# Patient Record
Sex: FEMALE | Race: WHITE | NOT HISPANIC OR LATINO | Employment: OTHER | URBAN - METROPOLITAN AREA
[De-identification: names, ages, dates, MRNs, and addresses within clinical notes are randomized per-mention and may not be internally consistent; named-entity substitution may affect disease eponyms.]

---

## 2017-04-11 PROBLEM — R19.7 DIARRHEA: Status: ACTIVE | Noted: 2017-04-11

## 2018-04-27 PROBLEM — Z93.2 ILEOSTOMY PRESENT: Status: ACTIVE | Noted: 2018-04-27

## 2018-04-27 PROBLEM — Z90.49 S/P COLECTOMY: Status: ACTIVE | Noted: 2018-04-27

## 2018-04-27 PROBLEM — E03.9 HYPOTHYROIDISM (ACQUIRED): Status: ACTIVE | Noted: 2018-04-27

## 2018-04-27 PROBLEM — R19.7 DIARRHEA: Status: RESOLVED | Noted: 2017-04-11 | Resolved: 2018-04-27

## 2018-04-27 PROBLEM — F84.2 RETT SYNDROME: Status: ACTIVE | Noted: 2018-04-27

## 2018-04-27 PROBLEM — Z87.19 HISTORY OF ULCERATIVE COLITIS: Status: ACTIVE | Noted: 2018-04-27

## 2018-04-27 PROBLEM — Z15.1 RETT SYNDROME: Status: ACTIVE | Noted: 2018-04-27

## 2018-11-29 PROBLEM — E86.0 DEHYDRATION: Status: ACTIVE | Noted: 2018-11-29

## 2018-11-29 PROBLEM — A41.9 SEPSIS: Status: ACTIVE | Noted: 2018-11-29

## 2018-11-29 PROBLEM — R65.20 SEVERE SEPSIS: Status: ACTIVE | Noted: 2018-11-29

## 2018-11-29 PROBLEM — E43 SEVERE MALNUTRITION: Status: ACTIVE | Noted: 2018-11-29

## 2018-11-29 PROBLEM — E87.0 HYPERNATREMIA: Status: ACTIVE | Noted: 2018-11-29

## 2018-11-29 PROBLEM — G40.909 SEIZURE DISORDER: Status: ACTIVE | Noted: 2018-11-29

## 2018-11-29 PROBLEM — N17.9 AKI (ACUTE KIDNEY INJURY): Status: ACTIVE | Noted: 2018-11-29

## 2018-11-29 PROBLEM — B37.0 ORAL THRUSH: Status: ACTIVE | Noted: 2018-11-29

## 2018-12-03 PROBLEM — R65.11 SIRS OF NON-INFECTIOUS ORIGIN W ACUTE ORGAN DYSFUNCTION: Status: ACTIVE | Noted: 2018-11-29

## 2018-12-05 ENCOUNTER — TELEPHONE (OUTPATIENT)
Dept: NEUROLOGY | Facility: CLINIC | Age: 38
End: 2018-12-05

## 2018-12-05 NOTE — TELEPHONE ENCOUNTER
----- Message from Jenny Baez sent at 12/4/2018 10:50 AM CST -----  Contact: Marimar  Type: Needs Medical Advice    Who Called:  Edie Lambert Call Back Number: [phone] (home)   Additional Information:Nurse Edie from [facility], would to get an appt to f/u the patient is being released today. Call patient to make an appt for a Hosptal f/u. Nurse stated if the appt can be soon please.thanks

## 2018-12-20 PROBLEM — E46 MALNUTRITION: Status: ACTIVE | Noted: 2018-11-29

## 2018-12-20 PROBLEM — R65.11 SIRS OF NON-INFECTIOUS ORIGIN W ACUTE ORGAN DYSFUNCTION: Status: RESOLVED | Noted: 2018-11-29 | Resolved: 2018-12-20

## 2018-12-20 PROBLEM — N17.9 AKI (ACUTE KIDNEY INJURY): Status: RESOLVED | Noted: 2018-11-29 | Resolved: 2018-12-20

## 2018-12-20 PROBLEM — E86.0 DEHYDRATION: Status: RESOLVED | Noted: 2018-11-29 | Resolved: 2018-12-20

## 2018-12-20 PROBLEM — E87.0 HYPERNATREMIA: Status: RESOLVED | Noted: 2018-11-29 | Resolved: 2018-12-20

## 2019-01-04 ENCOUNTER — OFFICE VISIT (OUTPATIENT)
Dept: NEUROLOGY | Facility: CLINIC | Age: 39
End: 2019-01-04
Payer: MEDICARE

## 2019-01-04 VITALS — HEIGHT: 56 IN | RESPIRATION RATE: 20 BRPM | WEIGHT: 62.81 LBS | BODY MASS INDEX: 14.13 KG/M2

## 2019-01-04 DIAGNOSIS — G40.909 SEIZURE DISORDER: Primary | ICD-10-CM

## 2019-01-04 DIAGNOSIS — F84.2 RETT SYNDROME: ICD-10-CM

## 2019-01-04 PROCEDURE — 99215 PR OFFICE/OUTPT VISIT, EST, LEVL V, 40-54 MIN: ICD-10-PCS | Mod: S$PBB,,, | Performed by: PSYCHIATRY & NEUROLOGY

## 2019-01-04 PROCEDURE — 99999 PR PBB SHADOW E&M-EST. PATIENT-LVL III: ICD-10-PCS | Mod: PBBFAC,,, | Performed by: PSYCHIATRY & NEUROLOGY

## 2019-01-04 PROCEDURE — 99213 OFFICE O/P EST LOW 20 MIN: CPT | Mod: PBBFAC,PN | Performed by: PSYCHIATRY & NEUROLOGY

## 2019-01-04 PROCEDURE — 99215 OFFICE O/P EST HI 40 MIN: CPT | Mod: S$PBB,,, | Performed by: PSYCHIATRY & NEUROLOGY

## 2019-01-04 PROCEDURE — 99999 PR PBB SHADOW E&M-EST. PATIENT-LVL III: CPT | Mod: PBBFAC,,, | Performed by: PSYCHIATRY & NEUROLOGY

## 2019-01-04 RX ORDER — OXCARBAZEPINE 300 MG/1
600 TABLET, FILM COATED ORAL 2 TIMES DAILY
COMMUNITY
End: 2019-05-28 | Stop reason: SDUPTHER

## 2019-01-04 NOTE — PROGRESS NOTES
Date: 1/4/2019    Patient ID: No Harmon is a 38 y.o. female.    Referring Provider:  Er, [facility]    Chief Complaint: Seizures      History of Present Illness:  Ms. 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 The patient was accompanied by her mother who also contributed to the following history. She recently saw Dr. Magaña in the hospital and is here to be established to ongoing care for her epilepsy.     She had onset of epilepsy was at age 5. Her seizure semiology she has pupillary diolation, hyperventilations, arms stiffen and shake and legs leg, she will fall. They last 1-2 minutes and she is mildly post-ictal afterwards. She commonly has 3-5 per month. She has never had better control that than. She takes trileptal 600 mg BID and clonazepam 1 mg BID. Her mother notes that she will have a seizure if her medication is late. She was previously on 900 mg BID and she has lost weight.     She was previously controlled but in November had complications due to ulcerative colitis, bowel obstruction, stoma prolapse, and abscess. She was unable to take oral medciations and was switched from her typical regimen of oxcarbazepine 600 mg BID and clonazepam 1 mg BID to keppra. She was on IV depakote briefly and got hyperammonia. She was then switched to keppra. She was having seizures and had side effects. She became very somnolent and dehydrated on the keppra and had reduced PO intake. She was seen by Dr. Magaña in the hospital who stopped the keppra and restarted oxcarbazepine and clonazepam.     Since the hospitalization, she has continued to have her baseline seizure frequency.     No family history of seizures. No meningitis/encephalitis. No head trauma. No stroke. Uncomplicated full term delivery.     Past medications include tegretol (had to wean off before the study), Trofinetide (study drug she took for a month  with complete seizure control), keppra (somnolence), and depakote (hyperammonemia). No VNS.     MRI - one at [facility] when she was young and diagnosed by Dr. Goodman. CT head in our system from 2016 was personally reviewed. This showed significant sinus disease but normal brain.     EEG - none available. About 5 years ago during study at [facility] This was during a study though and we don't know if they are available to be sent.     Outside records from [facility] reviewed. Trileptal level in outside records from [facility] was 27.7 in 2016. 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    Review of Systems:   UTO - nonverbal    Allergies:  Review of patient's allergies indicates:  No Known Allergies    Current Medications:  Current Outpatient Medications   Medication Sig Dispense Refill    clonazePAM (KLONOPIN) 0.5 MG tablet Take 2 tablets (1 mg total) by mouth 2 (two) times daily. for 7 days 28 tablet 0    levothyroxine (SYNTHROID) 25 MCG tablet Take 25 mcg by mouth once daily.      OXcarbazepine (TRILEPTAL) 300 MG Tab Take 600 mg by mouth 2 (two) times daily.       No current facility-administered medications for this visit.        Past Medical History:  Past Medical History:   Diagnosis Date    Allergy     Encounter for blood transfusion     History of Nissen fundoplication     Rett's syndrome     Rett's syndrome     Seizures     Ulcerative colitis 04/2017       Past Surgical History:  Past Surgical History:   Procedure Laterality Date    ABDOMINAL SURGERY  10/2017    removal of entire colon and has ileostomy    COLON SURGERY      COLONOSCOPY N/A 4/11/2017    [facility]    NISSEN FUNDOPLICATION      prolapse stoma      TOTAL COLECTOMY         Family History:  family history includes Allergies in her mother; Arthritis in her  "brother; Multiple sclerosis in her sister.    Social History:   reports that  has never smoked. she has never used smokeless tobacco. She reports that she does not drink alcohol or use drugs.    Physical Exam:  Vitals:    01/04/19 1315   Resp: 20   Weight: 28.5 kg (62 lb 12.8 oz)   Height: 4' 8" (1.422 m)   PainSc: 0-No pain     Body mass index is 14.08 kg/m². - Limited due to rett syndrome/nonverbal  General: thin, small frame, sleepy  Eyes: unable to visualize optic discs  Musculoskeletal: moves all extremities well.  Peripheral vascular: No edema noted    Neurological Exam:  Mental status: Sleepy. Unable to give any history.   Speech/Language: nonverbal  Motor: moves all extremities, resists arm movement  Reflexes: normal    Data:  I have personally reviewed the referring provider's notes, labs, & imaging made available to me today.       Labs:  CBC:   Lab Results   Component Value Date    WBC 9.09 12/04/2018    HGB 8.8 (L) 12/04/2018    HCT 26.5 (L) 12/04/2018     12/04/2018    MCV 95 12/04/2018    RDW 15.0 (H) 12/04/2018     BMP:   Lab Results   Component Value Date     12/04/2018    K 3.8 12/04/2018     12/04/2018    CO2 27 12/04/2018    BUN 24 (H) 12/04/2018    CREATININE 0.67 12/04/2018    GLU 94 12/04/2018    CALCIUM 8.9 12/04/2018    MG 2.1 12/02/2018    PHOS 3.7 12/02/2018     LFTS;   Lab Results   Component Value Date    PROT 7.6 12/02/2018    ALBUMIN 3.6 12/02/2018    BILITOT 0.1 (L) 12/02/2018    AST 28 12/02/2018    ALKPHOS 131 12/02/2018    ALT 32 12/02/2018     COAGS:   Lab Results   Component Value Date    INR 1.2 12/01/2018     FLP:   Lab Results   Component Value Date    CHOL 139 11/30/2018    HDL 23 (L) 11/30/2018    LDLCALC 73.0 11/30/2018    TRIG 215 (H) 11/30/2018    CHOLHDL 16.5 (L) 11/30/2018         Imaging:  I have personally reviewed the imaging, CT head from 2016 appears normal.     Assessment and Plan:  Ms. 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 " The seizure semiology sounds hypermotor - frontal? She is uncontrolled and has never had better control than 3-5 per month. No recently drug level. I will obtain oxcarbazepine level and consider adjustments. For now, we will continue doses. I will try to obtain records from [place] and [facility] for EEGs but will repeat here for our system. No need to repeat MRI at this time unless unable to obtain from outside hospitals or EEGs change/we want to consider surgical options.     She has previously tried tegretol, depakote, and keppra. I would try to change out clonazepam for Onfi, trial vimpat, or add zonisamide or topiramate. Caution will need to be given for dosing as she is only 62 pounds. I will see them back in 3 months or sooner if needed.     Seizure disorder  -     Oxcarbazepine level; Future; Expected date: 01/04/2019  -     Cancel: Comprehensive metabolic panel; Future; Expected date: 01/04/2019  -     Cancel: CBC auto differential; Future; Expected date: 01/04/2019  -     EEG,w/awake & asleep record; Future  -     CBC auto differential; Future; Expected date: 01/04/2019  -     Comprehensive metabolic panel; Future; Expected date: 01/04/2019    Rett syndrome  -     Oxcarbazepine level; Future; Expected date: 01/04/2019  -     Cancel: Comprehensive metabolic panel; Future; Expected date: 01/04/2019  -     Cancel: CBC auto differential; Future; Expected date: 01/04/2019  -     EEG,w/awake & asleep record; Future  -     CBC auto differential; Future; Expected date: 01/04/2019  -     Comprehensive metabolic panel; Future; Expected date: 01/04/2019    Greater than 50% of the patient's 40 minute clinic visit was spent on face to face counseling and arranging care.

## 2019-01-04 NOTE — LETTER
January 4, 2019      [facility]  [address] [facility] [address]           [place] [facility]  [address] [facility] [address] [place] [address]  Phone: [phone]  Fax: [phone]          Patient: No Harmon   MR Number: [id number]   YOB: 1980   Date of Visit: 1/4/2019       Dear [facility]:    Thank you for referring No Harmon to me for evaluation. Attached you will find relevant portions of my assessment and plan of care.    If you have questions, please do not hesitate to call me. I look forward to following No Harmon along with you.    Sincerely,    Yani Mccray MD    Enclosure  CC:  No Recipients    If you would like to receive this communication electronically, please contact [email or web address] or [phone] to request more information on [employer] Link access.    For providers and/or their staff who would like to refer a patient to [facility], please contact us through our one-stop-shop provider referral line, [facility], at [phone].    If you feel you have received this communication in error or would no longer like to receive these types of communications, please e-mail [email or web address]

## 2019-01-07 ENCOUNTER — TELEPHONE (OUTPATIENT)
Dept: NEUROLOGY | Facility: CLINIC | Age: 39
End: 2019-01-07

## 2019-01-07 DIAGNOSIS — G40.909 SEIZURE DISORDER: Primary | ICD-10-CM

## 2019-01-07 NOTE — TELEPHONE ENCOUNTER
Spoke with patient's mom. She called because she was wondering if Dr. Mccray wanted her to get a Klonopin level as well for her bloodwork or not. Please advise.

## 2019-01-07 NOTE — TELEPHONE ENCOUNTER
----- Message from Silverio Espinoza sent at 1/7/2019  4:34 PM CST -----  Type:  Patient Call Back    Who Called: pt mom rommel  Does the patient know what this is regarding?: mom needs to talk about blood order make up. She feels that there needs to be more tests ran on the order.  Best Call Back Number:  [phone]  Additional Information:  Please call pt and leave a detailed message if there is no answer.

## 2019-01-14 ENCOUNTER — TELEPHONE (OUTPATIENT)
Dept: NEUROLOGY | Facility: CLINIC | Age: 39
End: 2019-01-14

## 2019-01-14 NOTE — TELEPHONE ENCOUNTER
----- Message from Jackie Munson sent at 1/14/2019  1:16 PM CST -----  Contact: Irma Saez want to inform office fax number to fax patient orders please fax to [phone], any questions please call back at [phone] (home)

## 2019-01-23 ENCOUNTER — TELEPHONE (OUTPATIENT)
Dept: NEUROLOGY | Facility: CLINIC | Age: 39
End: 2019-01-23

## 2019-01-23 NOTE — TELEPHONE ENCOUNTER
External labs received and reviewed. CMP normal except for elevated Alk phos at 128. This could either be liver or bone. If she has not had a DEXA bone scan, we need to consider this. Platelet count is high at 481. This should not be due to her medications. I recommend she followup with her primary care provider regarding this.     Oxcarb metabolite level is 13.9 (reference 8-35) and therefore I would not make any changes. Clonzepam level is lower at 14 which is good but it still could be making her sleepy. We can consider switching that out for a different medication or adding a new medication but I'd like to see what her EEG looks like first.

## 2019-02-06 DIAGNOSIS — R56.9 SEIZURE: Primary | ICD-10-CM

## 2019-02-06 DIAGNOSIS — G40.909 SEIZURE DISORDER: ICD-10-CM

## 2019-02-06 RX ORDER — CLONAZEPAM 0.5 MG/1
TABLET ORAL
Qty: 150 TABLET | Refills: 3 | Status: SHIPPED | OUTPATIENT
Start: 2019-02-06 | End: 2019-07-07 | Stop reason: SDUPTHER

## 2019-02-06 NOTE — TELEPHONE ENCOUNTER
Dr. Mccray, per your last clinic note, you stated in your plan that you may try to switch pts clonazepam to onfi or other medications; pt mom is calling for refill on clonazepam; please advise.

## 2019-02-06 NOTE — TELEPHONE ENCOUNTER
----- Message from Maryanne Salgado sent at 2/6/2019 11:45 AM CST -----  Type:  RX Refill Request    Who Called:  Mom Irma  Refill or New Rx:  New   RX Name and Strength:  Clonazepam .5 mg  How is the patient currently taking it? (ex. 1XDay):  2.5 by mouth twice daily  Is this a 30 day or 90 day RX:  30  Preferred Pharmacy with phone number:    [facility]/pharmacy #[id number] - [place] - [address]  [place] [address]  Phone: [phone] Fax: [phone]  Local or Mail Order:  local  Ordering Provider:  Previously Dr RADHA Garcia  Best Call Back Number:  [phone]  Additional Information:  She has 5 days supply left at this point.  Thank you!

## 2019-03-22 ENCOUNTER — OFFICE VISIT (OUTPATIENT)
Dept: OBSTETRICS AND GYNECOLOGY | Facility: CLINIC | Age: 39
End: 2019-03-22
Payer: MEDICARE

## 2019-03-22 VITALS — BODY MASS INDEX: 15.03 KG/M2 | WEIGHT: 67 LBS

## 2019-03-22 DIAGNOSIS — N91.2 AMENORRHEA: ICD-10-CM

## 2019-03-22 DIAGNOSIS — R63.4 ABNORMAL WEIGHT LOSS: ICD-10-CM

## 2019-03-22 DIAGNOSIS — E28.2 PCOS (POLYCYSTIC OVARIAN SYNDROME): Primary | ICD-10-CM

## 2019-03-22 PROCEDURE — 99204 PR OFFICE/OUTPT VISIT, NEW, LEVL IV, 45-59 MIN: ICD-10-PCS | Mod: S$PBB,,, | Performed by: SPECIALIST

## 2019-03-22 PROCEDURE — 99999 PR PBB SHADOW E&M-EST. PATIENT-LVL II: ICD-10-PCS | Mod: PBBFAC,,, | Performed by: SPECIALIST

## 2019-03-22 PROCEDURE — 99212 OFFICE O/P EST SF 10 MIN: CPT | Mod: PBBFAC,PN | Performed by: SPECIALIST

## 2019-03-22 PROCEDURE — 99999 PR PBB SHADOW E&M-EST. PATIENT-LVL II: CPT | Mod: PBBFAC,,, | Performed by: SPECIALIST

## 2019-03-22 PROCEDURE — 99204 OFFICE O/P NEW MOD 45 MIN: CPT | Mod: S$PBB,,, | Performed by: SPECIALIST

## 2019-03-22 NOTE — LETTER
March 28, 2019      Bernadette Moore MD  [address] [place] [address] Dr Amadeo TAYLOR  [facility] [address]           [facility]  [address] [address] [address]  Phone: [phone]  Fax: [phone]          Patient: No Harmon   MR Number: [id number]   YOB: 1980   Date of Visit: 3/22/2019       Dear Dr. Bernadette Moore:    Thank you for referring No Harmon to me for evaluation. Attached you will find relevant portions of my assessment and plan of care.    If you have questions, please do not hesitate to call me. I look forward to following No Harmon along with you.    Sincerely,    Gary Bradshaw MD    Enclosure  CC:  No Recipients    If you would like to receive this communication electronically, please contact [email or web address] or [phone] to request more information on [employer] Link access.    For providers and/or their staff who would like to refer a patient to [facility], please contact us through our one-stop-shop provider referral line, [facility], at [phone].    If you feel you have received this communication in error or would no longer like to receive these types of communications, please e-mail [email or web address]

## 2019-03-28 ENCOUNTER — TELEPHONE (OUTPATIENT)
Dept: OBSTETRICS AND GYNECOLOGY | Facility: CLINIC | Age: 39
End: 2019-03-28

## 2019-03-28 NOTE — TELEPHONE ENCOUNTER
Patient's mother notified of blood work. The mother wants to know why a pap smear hasn't been done. She states that if it's just to keep from causing the patient trauma then we should not worry about that since the patient doesn't feel pain.

## 2019-03-28 NOTE — TELEPHONE ENCOUNTER
----- Message from Gary Bradshaw MD sent at 3/28/2019  2:58 PM CDT -----  Pt has elevated prolactin level which will result in amenorrhea.  I do not recommend correcting it however. I rec repeating it in 3 -6 months

## 2019-03-28 NOTE — PROGRESS NOTES
39 yo mentally challenged WF Go presents with caregiver for eval, secondary amenorrhea, alopecia, several years. Pt very thin with approx 3-5% body fat and in addition, less than 48 kilos. Caregiver denies any overt weight changes , nipple discharge, change in activity or meds.  Past Medical History:   Diagnosis Date    Allergy     Encounter for blood transfusion     History of Nissen fundoplication     Rett's syndrome     Rett's syndrome     Seizures     Ulcerative colitis 04/2017       Past Surgical History:   Procedure Laterality Date    ABDOMINAL SURGERY  10/2017    removal of entire colon and has ileostomy    COLON SURGERY      COLONOSCOPY N/A 4/11/2017    [facility]formed by [facility] ENDO    NISSEN FUNDOPLICATION      prolapse stoma      TOTAL COLECTOMY         Family History   Problem Relation Age of Onset    Allergies Mother     Multiple sclerosis Sister     Arthritis Brother         psoriatric arthritis       Social History     Socioeconomic History    Marital status: Single     Spouse name: None    Number of children: None    Years of education: None    Highest education level: None   Occupational History    None   Social Needs    Financial resource strain: None    Food insecurity:     Worry: None     Inability: None    Transportation needs:     Medical: None     Non-medical: None   Tobacco Use    Smoking status: Never Smoker    Smokeless tobacco: Never Used   Substance and Sexual Activity    Alcohol use: No    Drug use: No    Sexual activity: Never   Lifestyle    Physical activity:     Days per week: None     Minutes per session: None    Stress: None   Relationships    Social connections:     Talks on phone: None     Gets together: None     Attends Sabianist service: None     Active member of club or organization: None     Attends meetings of clubs or organizations: None     Relationship status: None    Intimate partner violence:     Fear of current or ex  partner: None     Emotionally abused: None     Physically abused: None     Forced sexual activity: None   Other Topics Concern    None   Social History Narrative    None       Current Outpatient Medications   Medication Sig Dispense Refill    clonazePAM (KLONOPIN) 0.5 MG tablet Take 2 1/2 tablets twice per day 150 tablet 3    levothyroxine (SYNTHROID) 25 MCG tablet Take 25 mcg by mouth once daily.      OXcarbazepine (TRILEPTAL) 300 MG Tab Take 600 mg by mouth 2 (two) times daily.       No current facility-administered medications for this visit.        Review of patient's allergies indicates:  No Known Allergies    Review of System:   General: no chills, fever, night sweats, weight gain or weight loss  Psychological: no depression or suicidal ideation  Breasts: no new or changing breast lumps, nipple discharge or masses.  Respiratory: no cough, shortness of breath, or wheezing  Cardiovascular: no chest pain or dyspnea on exertion  Gastrointestinal: no abdominal pain, change in bowel habits, or black or bloody stools  Genito-Urinary: no incontinence, urinary frequency/urgency or vulvar/vaginal symptoms, pelvic pain. AMENORRHEA  Musculoskeletal: no gait disturbance or muscular weakness    Discussed critcal fat percentage as well as likely protein deficient and thus would explain hair thinning and loss as well as secondary amenorrhea.  I rec Prolactin and PCO panel to assess further.  No overt intervention needed at this point. Would rec for diagnostic purposes only.  Will follow

## 2019-05-28 ENCOUNTER — TELEPHONE (OUTPATIENT)
Dept: NEUROLOGY | Facility: CLINIC | Age: 39
End: 2019-05-28

## 2019-05-28 RX ORDER — OXCARBAZEPINE 300 MG/1
600 TABLET, FILM COATED ORAL 2 TIMES DAILY
Qty: 120 TABLET | Refills: 11 | Status: SHIPPED | OUTPATIENT
Start: 2019-05-28 | End: 2020-03-20

## 2019-05-28 NOTE — TELEPHONE ENCOUNTER
----- Message from Saad Cedillo sent at 5/28/2019  3:53 PM CDT -----  Type:  RX Refill Request    Who Called:  Patient  Refill or New Rx:  Refill  RX Name and Strength:  Trileptel 600mg  Ordering Provider: Dr. Jones, see Yani Mccray now  Pharmacy:   [facility]/pharmacy #[id number] - [place] - [address]  [place] [address]  Phone: [phone] Fax: [phone]  Best Call Back Number:  [phone] (home)   Additional Information:  Would like to know if the pills can be prescribed in 300mgs as that is easier for the patient to take 2 of rather than 1 600mg - please call to advise.

## 2019-07-07 DIAGNOSIS — G40.909 SEIZURE DISORDER: ICD-10-CM

## 2019-07-08 RX ORDER — CLONAZEPAM 0.5 MG/1
TABLET ORAL
Qty: 150 TABLET | Refills: 3 | Status: SHIPPED | OUTPATIENT
Start: 2019-07-08

## 2019-11-05 ENCOUNTER — TELEPHONE (OUTPATIENT)
Dept: NEUROLOGY | Facility: CLINIC | Age: 39
End: 2019-11-05

## 2019-11-05 NOTE — TELEPHONE ENCOUNTER
----- Message from Reinaldo Nair sent at 11/5/2019  9:42 AM CST -----  Type:  Sooner Apoointment Request    Caller is requesting a sooner appointment.  Caller declined first available appointment listed below.  Caller will not accept being placed on the waitlist and is requesting a message be sent to doctor.    Name of Caller:  Mother-Irma Harmon   When is the first available appointment?    Symptoms:    Best Call Back Number: [phone]  Additional Information:  Pt mother requesting to schedule EEG awake and asleep record.

## 2019-12-16 ENCOUNTER — TELEPHONE (OUTPATIENT)
Dept: NEUROLOGY | Facility: CLINIC | Age: 39
End: 2019-12-16

## 2019-12-16 NOTE — TELEPHONE ENCOUNTER
----- Message from Mallorie Tamayo sent at 12/16/2019 11:34 AM CST -----  Contact: GURDEEP - Irma- mother  Pt mom  would like to be called back regarding seizure rx   Pt can be reached at [phone]

## 2019-12-17 ENCOUNTER — TELEPHONE (OUTPATIENT)
Dept: NEUROLOGY | Facility: CLINIC | Age: 39
End: 2019-12-17

## 2019-12-17 NOTE — TELEPHONE ENCOUNTER
----- Message from Tila Leary sent at 12/16/2019  3:13 PM CST -----  Contact: Irma Harmon (Mother)  Type:  Patient Returning Call    Who Called:  Irma Harmon (Mother)  Who Left Message for Patient:  Linda  Does the patient know what this is regarding?:  yes  Best Call Back Number:    Additional Information:  Call to pod, no answer.

## 2019-12-18 ENCOUNTER — TELEPHONE (OUTPATIENT)
Dept: NEUROLOGY | Facility: CLINIC | Age: 39
End: 2019-12-18

## 2019-12-18 NOTE — TELEPHONE ENCOUNTER
Spoke with patient's mother. She stated that patient has had some breakthrough seizures but it's due to mother giving her medications sometimes a little late time wise. She stated that the patient has been having other health issues so they haven't had the EEG yet, but she's going to schedule it. She wanted to know if patient should hold meds the morning of EEG or take them.

## 2019-12-18 NOTE — TELEPHONE ENCOUNTER
----- Message from Shahzad Pettit sent at 12/18/2019 10:30 AM CST -----  Type: Needs Medical Advice    Who Called:  Irma Harmon (Mother    Best Call Back Number: [phone]  Additional Information: Caller states that she would like a callback regarding the patient should have her seizure medicine prior to her EEG.

## 2020-03-20 RX ORDER — OXCARBAZEPINE 300 MG/1
600 TABLET, FILM COATED ORAL 2 TIMES DAILY
Qty: 360 TABLET | Refills: 3 | Status: SHIPPED | OUTPATIENT
Start: 2020-03-20 | End: 2021-01-19

## 2020-04-06 ENCOUNTER — TELEPHONE (OUTPATIENT)
Dept: NEUROLOGY | Facility: CLINIC | Age: 40
End: 2020-04-06

## 2020-04-06 NOTE — TELEPHONE ENCOUNTER
----- Message from Radha Dalal MA sent at 4/6/2020  9:59 AM CDT -----  Contact: patient mother Hilda   Patient mom states patient need medication changed or letter written to say patient need medication (patient received letter from [employer] )      Please call to advise patient mom Irma at [phone]

## 2020-04-06 NOTE — TELEPHONE ENCOUNTER
----- Message from Lisset Partida sent at 4/6/2020  2:09 PM CDT -----  Contact: pt [phone]  Call placed to pod.  Patient is returning a call back to the office she just missed a call from the nurse.  Call back [phone]

## 2020-04-06 NOTE — TELEPHONE ENCOUNTER
Returned call to pt mom who received a letter from insurance company that they are no longer covering trileptal. Mom states that pt was on a previous regimen of tegretol and klonipin and only DC'd the tegretol bc they were in a study at [facility] years ago. Mom states that the tegretol/klonipin worked. Can they switch back and if so, can you send it over please?

## 2020-04-07 ENCOUNTER — TELEPHONE (OUTPATIENT)
Dept: NEUROLOGY | Facility: CLINIC | Age: 40
End: 2020-04-07

## 2020-04-07 NOTE — TELEPHONE ENCOUNTER
----- Message from Laura Marmolejo sent at 4/7/2020  9:01 AM CDT -----  Contact: Irma Harmon (Mother) [phone]   Irma Harmon (Mother) is requesting a call back from the nurse stated the insurance is no longer covering TRILEPTAL.  Can patient get an alternative or letter stating TRILEPTAL is medically necessary.    Please call the patient mother upon request at phone number [phone].

## 2020-04-08 ENCOUNTER — TELEPHONE (OUTPATIENT)
Dept: NEUROLOGY | Facility: CLINIC | Age: 40
End: 2020-04-08

## 2020-04-08 NOTE — TELEPHONE ENCOUNTER
Mom will fax the letter from the insurance company to the office and we will see if we can get a PA.

## 2020-04-08 NOTE — TELEPHONE ENCOUNTER
Returned call to pt mom and discussed medication and Dr. Carson's request for medication denial letter. Mom states that she addressed this today with the insurance company and hopefully the issue has been resolved. States that she will call us if she needs anything. Fax number provided for letter to show Dr. Carson.

## 2020-04-08 NOTE — TELEPHONE ENCOUNTER
----- Message from Erica Garza sent at 4/7/2020  4:42 PM CDT -----    Type:  RX Refill Request  Please  Call  My for  All details Who Called:  Pt mom rommel  Refill   RX Name and Strength:  Trileptal 2  300 Mg twice   No  Longer  Covered by ins / we  Something to replace //   Preferred Pharmacy with phone number:    [facility]/pharmacy #[id number] - [place] - [address]  [place] [address]  Phone: [phone] Fax: [phone]  Best Call Back Number:  [phone]  Additional Information:  Please call for details    Ins  No longer covering  med

## 2020-04-14 PROBLEM — K56.609 SBO (SMALL BOWEL OBSTRUCTION): Status: ACTIVE | Noted: 2020-04-14

## 2020-04-14 PROBLEM — K94.19 ILEOSTOMY PROLAPSE: Status: ACTIVE | Noted: 2020-04-14

## 2021-01-19 RX ORDER — OXCARBAZEPINE 300 MG/1
600 TABLET, FILM COATED ORAL 2 TIMES DAILY
Qty: 360 TABLET | Refills: 0 | Status: SHIPPED | OUTPATIENT
Start: 2021-01-19 | End: 2022-11-18 | Stop reason: SDUPTHER

## 2022-10-13 ENCOUNTER — OFFICE VISIT (OUTPATIENT)
Dept: NEUROLOGY | Facility: CLINIC | Age: 42
End: 2022-10-13
Payer: MEDICARE

## 2022-10-13 VITALS — BODY MASS INDEX: 16.56 KG/M2 | HEIGHT: 55 IN | WEIGHT: 71.56 LBS

## 2022-10-13 DIAGNOSIS — F84.2 RETT SYNDROME: ICD-10-CM

## 2022-10-13 DIAGNOSIS — G40.909 SEIZURE DISORDER: Primary | ICD-10-CM

## 2022-10-13 PROCEDURE — 99204 PR OFFICE/OUTPT VISIT, NEW, LEVL IV, 45-59 MIN: ICD-10-PCS | Mod: S$PBB,,, | Performed by: PSYCHIATRY & NEUROLOGY

## 2022-10-13 PROCEDURE — 99204 OFFICE O/P NEW MOD 45 MIN: CPT | Mod: S$PBB,,, | Performed by: PSYCHIATRY & NEUROLOGY

## 2022-10-13 PROCEDURE — 99999 PR PBB SHADOW E&M-EST. PATIENT-LVL II: CPT | Mod: PBBFAC,,, | Performed by: PSYCHIATRY & NEUROLOGY

## 2022-10-13 PROCEDURE — 99999 PR PBB SHADOW E&M-EST. PATIENT-LVL II: ICD-10-PCS | Mod: PBBFAC,,, | Performed by: PSYCHIATRY & NEUROLOGY

## 2022-10-13 PROCEDURE — 99212 OFFICE O/P EST SF 10 MIN: CPT | Mod: PBBFAC,PO | Performed by: PSYCHIATRY & NEUROLOGY

## 2022-10-13 NOTE — PROGRESS NOTES
Date: 10/13/2022    Patient ID: No Harmon is a 41 y.o. female.    Chief Complaint: Seizures      History of Present Illness:  Ms. Harmon is a 41 y.o. female who presents for followup of Rett syndrome and epilepsy. The patient was accompanied by her mother and father who gave the following history. I last saw her in Jan 2019.     Interval history: Over the past 3 years, she has continued to have 2-3 seizures per week. They have noticed that she is very time sensitive to medication. He hears her had seizures at night.     She is on trileptal 600 mg BID and klonopin 1.25 mg BID. Sometimes she will have an extra but it doesn't seemed to make her more sedated.     She will be starting on teriflunide when it becomes available, likely March 2023.     Seizure history:   She had onset of epilepsy was at age 5. Her seizure semiology she has pupillary diolation, hyperventilations, arms stiffen and shake and legs leg, she will fall. They last 1-2 minutes and she is mildly post-ictal afterwards. She commonly has 3-5 per month. She has never had better control that than. She takes trileptal 600 mg BID and clonazepam 1 mg BID. Her mother notes that she will have a seizure if her medication is late. She was previously on 900 mg BID and she has lost weight.      She was previously controlled but in November 2018 had complications due to ulcerative colitis, bowel obstruction, stoma prolapse, and abscess. She was unable to take oral medciations and was switched from her typical regimen of oxcarbazepine 600 mg BID and clonazepam 1 mg BID to keppra. She was on IV depakote briefly and got hyperammonia. She was then switched to keppra. She was having seizures and had side effects. She became very somnolent and dehydrated on the keppra and had reduced PO intake. She was seen by Dr. Magaña in the hospital who stopped the keppra and restarted oxcarbazepine and clonazepam.      No family history of seizures. No  meningitis/encephalitis. No head trauma. No stroke. Uncomplicated full term delivery.      Past medications include tegretol (had to wean off before the study), Trofinetide (study drug she took for a month with complete seizure control), keppra (somnolence), and depakote (hyperammonemia). No VNS.      MRI - one at [facility] when she was young and diagnosed by Dr. Goodman. CT head in our system from 2016 was personally reviewed. This showed significant sinus disease but normal brain.      EEG - none available. About 5 years ago during study at [facility] This was during a study though and we don't know if they are available to be sent.      Outside records from [facility] reviewed. Trileptal level in outside records from [facility] was 27.7 in 2016. 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       Allergies:  Review of patient's allergies indicates:  No Known Allergies    Current Medications:  Current Outpatient Medications   Medication Sig Dispense Refill    clonazePAM (KLONOPIN) 0.5 MG tablet TAKE 2 1/2 TABLETS BY MOUTH TWICE PER DAY (Patient taking differently: Take 1 mg by mouth 2 (two) times daily.) 150 tablet 3    levothyroxine (SYNTHROID) 25 MCG tablet Take 25 mcg by mouth before breakfast.       OXcarbazepine (TRILEPTAL) 300 MG Tab TAKE 2 TABLETS (600 MG TOTAL) BY MOUTH 2 (TWO) TIMES DAILY. 360 tablet 0     No current facility-administered medications for this visit.       Past Medical History:  Past Medical History:   Diagnosis Date    Allergy     Encounter for blood transfusion     History of Nissen fundoplication     Rett's syndrome     Rett's syndrome     Seizures     Ulcerative colitis 04/2017       Past Surgical History:  Past Surgical History:   Procedure Laterality Date    ABDOMINAL SURGERY  10/2017    removal of entire colon and has ileostomy    COLON SURGERY       "COLONOSCOPY N/A 4/11/2017    Procedure: COLONOSCOPY;  Surgeon: Isra Gonzalez MD;  Location: [facility] ENDO;  Service: Endoscopy;  Laterality: N/A;    ILEOSTOMY REVISION N/A 4/14/2020    Procedure: REVISION, ILEOSTOMY;  Surgeon: Larry Campos MD;  Location: [facility] OR;  Service: General;  Laterality: N/A;    NISSEN FUNDOPLICATION      prolapse stoma      REPAIR OF EPIGASTRIC HERNIA N/A 4/14/2020    Procedure: REPAIR, HERNIA, EPIGASTRIC-repair parastoma hernia;  Surgeon: Larry Campos MD;  Location: [facility] OR;  Service: General;  Laterality: N/A;    TOTAL COLECTOMY         Family History:  family history includes Allergies in her mother; Arthritis in her brother; Multiple sclerosis in her sister.    Social History:   reports that she has never smoked. She has never used smokeless tobacco. She reports that she does not drink alcohol and does not use drugs.    Physical Exam:  Vitals:    10/13/22 0904   Weight: 32.5 kg (71 lb 8.6 oz)   Height: 4' 5" (1.346 m)     Body mass index is 17.91 kg/m².    Neurological Exam:  Mental status: awake and alert  CN: Pupils round and reactive to light  Speech/Language: nonverbal  Motor: resists arm movement  Reflexes: normal bilateral patellar    Data:  I have personally reviewed other provider's notes, labs, & imaging made available to me today.     Labs:  CBC:   Lab Results   Component Value Date    WBC 8.5 03/17/2022    HGB 13.1 03/17/2022    HCT 40.4 03/17/2022     (H) 03/17/2022    MCV 93.3 03/17/2022    RDW 14.3 03/17/2022     BMP:   Lab Results   Component Value Date     03/17/2022    K 4.2 03/17/2022     03/17/2022    CO2 23 03/17/2022    BUN 12 03/17/2022    CREATININE 0.74 03/17/2022     (H) 03/17/2022    CALCIUM 9.1 03/17/2022    MG 2.1 04/17/2020    PHOS 3.7 12/02/2018     LFTS;   Lab Results   Component Value Date    PROT 7.5 03/17/2022    ALBUMIN 4.2 03/17/2022    BILITOT 0.3 03/17/2022    AST 23 03/17/2022    ALKPHOS 100 04/17/2020    ALT 22 " 03/17/2022     COAGS:   Lab Results   Component Value Date    INR 1.2 12/01/2018     FLP:   Lab Results   Component Value Date    CHOL 139 11/30/2018    HDL 23 (L) 11/30/2018    LDLCALC 73.0 11/30/2018    TRIG 215 (H) 11/30/2018    CHOLHDL 16.5 (L) 11/30/2018       Imaging:  I have personally reviewed the imaging, CT head showed no acute abnormality in 2016.     Assessment and Plan:  Ms. Harmon is a 41 y.o. female here for followup of epilepsy and jane syndrome. Will check trough level of oxcarbazepine to see if need to increase. Will also check CBC and CMP as well for monitoring purposes. Could also consider adding midday klonopin dose but would like to avoid this due to potential sedative effect. They note she will be starting on trofinetide next year and when she was in the trial for this her seizures stopped. They are hopeful for improvement once starting that medication. We discussed that potentially we could even reduce seizure medication doses if that is the case.      Seizure disorder  -     Cancel: Comprehensive metabolic panel; Future; Expected date: 10/13/2022  -     Cancel: Oxcarbazepine level; Future; Expected date: 10/13/2022  -     Cancel: CBC Auto Differential; Future; Expected date: 10/13/2022  -     CBC Auto Differential; Future; Expected date: 10/13/2022  -     Comprehensive metabolic panel; Future; Expected date: 10/13/2022  -     Oxcarbazepine level; Future; Expected date: 10/13/2022    Rett syndrome  -     Cancel: Comprehensive metabolic panel; Future; Expected date: 10/13/2022  -     Cancel: Oxcarbazepine level; Future; Expected date: 10/13/2022  -     Cancel: CBC Auto Differential; Future; Expected date: 10/13/2022  -     CBC Auto Differential; Future; Expected date: 10/13/2022  -     Comprehensive metabolic panel; Future; Expected date: 10/13/2022  -     Oxcarbazepine level; Future; Expected date: 10/13/2022

## 2022-11-02 ENCOUNTER — TELEPHONE (OUTPATIENT)
Dept: NEUROLOGY | Facility: CLINIC | Age: 42
End: 2022-11-02
Payer: MEDICARE

## 2022-11-02 NOTE — TELEPHONE ENCOUNTER
Lab results received from [employer]. Copy sent to scanning and copy given to Dr. Mccray to review.

## 2022-11-18 ENCOUNTER — PATIENT MESSAGE (OUTPATIENT)
Dept: NEUROLOGY | Facility: CLINIC | Age: 42
End: 2022-11-18
Payer: MEDICARE

## 2022-11-18 RX ORDER — OXCARBAZEPINE 300 MG/1
900 TABLET, FILM COATED ORAL 2 TIMES DAILY
Qty: 540 TABLET | Refills: 3 | Status: SHIPPED | OUTPATIENT
Start: 2022-11-18 | End: 2022-11-28 | Stop reason: SDUPTHER

## 2022-11-18 NOTE — TELEPHONE ENCOUNTER
Oxcarb level 23.8 (8-35)  WBC high at 12  AST and ALT normal. Na 138    Can increase oxcarbazepine to 900 mg twice daily.     Would recommend following up with PCP about the high white blood cell count to have it repeated and make sure it normalizes

## 2022-11-28 ENCOUNTER — PATIENT MESSAGE (OUTPATIENT)
Dept: NEUROLOGY | Facility: CLINIC | Age: 42
End: 2022-11-28
Payer: MEDICARE

## 2022-11-28 RX ORDER — OXCARBAZEPINE 300 MG/1
750 TABLET, FILM COATED ORAL 2 TIMES DAILY
Qty: 540 TABLET | Refills: 3 | Status: SHIPPED | OUTPATIENT
Start: 2022-11-28 | End: 2024-01-11

## 2023-10-28 PROBLEM — K94.19: Status: ACTIVE | Noted: 2023-10-28

## 2024-01-11 DIAGNOSIS — G40.909 SEIZURE DISORDER: Primary | ICD-10-CM

## 2024-01-11 RX ORDER — OXCARBAZEPINE 300 MG/1
TABLET, FILM COATED ORAL
Qty: 540 TABLET | Refills: 0 | Status: SHIPPED | OUTPATIENT
Start: 2024-01-11

## 2024-01-31 ENCOUNTER — TELEPHONE (OUTPATIENT)
Dept: NEUROLOGY | Facility: CLINIC | Age: 44
End: 2024-01-31
Payer: MEDICARE

## 2024-01-31 NOTE — TELEPHONE ENCOUNTER
Spoke to the pt's mom, appt scheduled on 4/16/24 at 1300 with Dr. Mccray.  Date, time and location discussed.

## 2024-03-21 PROBLEM — Z71.89 OSTOMY NURSE CONSULTATION: Status: ACTIVE | Noted: 2024-03-21

## 2024-09-12 ENCOUNTER — TELEPHONE (OUTPATIENT)
Dept: PAIN MEDICINE | Facility: CLINIC | Age: 44
End: 2024-09-12
Payer: MEDICARE

## 2024-09-12 NOTE — TELEPHONE ENCOUNTER
----- Message from Brynn Beasley sent at 9/11/2024  9:21 AM CDT -----  Regarding: Needs to reschedule  Type: Needs Medical Advice  Who Called:  Se    Best Call Back Number: [phone]    Additional Information: The 0ct 2nd appt doesn't work, they will be out of town they need to reschedule please treasure

## 2024-09-19 ENCOUNTER — OFFICE VISIT (OUTPATIENT)
Dept: NEUROLOGY | Facility: CLINIC | Age: 44
End: 2024-09-19
Payer: MEDICARE

## 2024-09-19 VITALS — HEIGHT: 56 IN | BODY MASS INDEX: 15.69 KG/M2

## 2024-09-19 DIAGNOSIS — F84.2 RETT SYNDROME: ICD-10-CM

## 2024-09-19 DIAGNOSIS — G40.909 SEIZURE DISORDER: Primary | ICD-10-CM

## 2024-09-19 PROCEDURE — 99213 OFFICE O/P EST LOW 20 MIN: CPT | Mod: PBBFAC,PO | Performed by: PSYCHIATRY & NEUROLOGY

## 2024-09-19 PROCEDURE — 99999 PR PBB SHADOW E&M-EST. PATIENT-LVL III: CPT | Mod: PBBFAC,,, | Performed by: PSYCHIATRY & NEUROLOGY

## 2024-09-19 PROCEDURE — 99215 OFFICE O/P EST HI 40 MIN: CPT | Mod: S$PBB,,, | Performed by: PSYCHIATRY & NEUROLOGY

## 2024-09-19 PROCEDURE — G2211 COMPLEX E/M VISIT ADD ON: HCPCS | Mod: S$PBB,,, | Performed by: PSYCHIATRY & NEUROLOGY

## 2024-09-19 RX ORDER — CLOBAZAM 10 MG/1
10 TABLET ORAL NIGHTLY
Qty: 30 EACH | Refills: 5 | Status: SHIPPED | OUTPATIENT
Start: 2024-09-19 | End: 2025-03-18

## 2024-09-19 NOTE — PROGRESS NOTES
Date: 9/19/2024    Patient ID: No Harmon is a 43 y.o. female.    Chief Complaint: Seizures      History of Present Illness:  Ms. Harmon is a 43 y.o. female who presents for followup of Rett syndrome and epilepsy. The patient was accompanied by her mother and father who gave the following history. I last saw her in 2022.      Interval history: Over the past 2 years since our last appointment, she has continued to have seizures about 2-3 seizures per week. Last though was 2 months ago when medications weren't given. She is currently on trileptal 600 mg BID and klonopin 1.25 mg BID.     The parents are concerned though because she has decreased function, more stiffness, and more unsteady with more falls. The mom notes increased caregiving as she is no longer walking.      She also follows with [facility]. Neurology there had her on buspirone and tried to wean klonopin. They put her back up on klonopin because seizure frequency was increasing. Buspirone caused grogginess so they weaned off it.     She has been part of a trial and then on trofenetide bu stopped this in Oct 2023. They didn't see benefit and her gait worsened. They are now thinking though that she is even worse off it so thinking of restarting it.      Seizure history:   She had onset of epilepsy was at age 5. Her seizure semiology she has pupillary diolation, hyperventilations, arms stiffen and shake and legs leg, she will fall. They last 1-2 minutes and she is mildly post-ictal afterwards. She commonly has 3-5 per month. She has never had better control that than. She takes trileptal 600 mg BID and clonazepam 1 mg BID. Her mother notes that she will have a seizure if her medication is late. She was previously on 900 mg BID and she has lost weight.      She was previously controlled but in November 2018 had complications due to ulcerative colitis, bowel obstruction, stoma prolapse, and abscess. She was unable to take oral  medciations and was switched from her typical regimen of oxcarbazepine 600 mg BID and clonazepam 1 mg BID to keppra. She was on IV depakote briefly and got hyperammonia. She was then switched to keppra. She was having seizures and had side effects. She became very somnolent and dehydrated on the keppra and had reduced PO intake. She was seen by Dr. Magaña in the hospital who stopped the keppra and restarted oxcarbazepine and clonazepam.      No family history of seizures. No meningitis/encephalitis. No head trauma. No stroke. Uncomplicated full term delivery.      Past medications include tegretol (had to wean off before the study), Trofinetide (study drug she took for a month with complete seizure control), keppra (somnolence), and depakote (hyperammonemia). No VNS.      MRI - one at [facility] when she was young and diagnosed by Dr. Goodman. CT head in our system from 2016 was personally reviewed. This showed significant sinus disease but normal brain.      EEG - none available. About 5 years ago during study at [facility]. This was during a study though and we don't know if they are available to be sent.      Outside records from [facility] reviewed. Trileptal level in outside records from [facility] was 27.7 in 2016. MECP2 mutational analysis is recently been done and demonstrated a disease causing mutation [id number] in exon 4 (frameshift mutation).       Allergies:  Review of patient's allergies indicates:  No Known Allergies    Current Medications:  Current Outpatient Medications   Medication Sig Dispense Refill    clonazePAM (KLONOPIN) 0.5 MG tablet TAKE 2 1/2 TABLETS BY MOUTH TWICE PER DAY (Patient taking differently: Take 1.25 mg by mouth 2 (two) times daily.) 150 tablet 3    levothyroxine (SYNTHROID) 25 MCG tablet Take 25 mcg by mouth before breakfast.       OXcarbazepine (TRILEPTAL) 300 MG Tab TAKE 3 TABLETS (900 MG TOTAL) BY MOUTH 2 (TWO) TIMES DAILY. 540 tablet  "0    cloBAZam (ONFI) 10 mg Tab Take 1 each (10 mg total) by mouth every evening. 30 each 5     No current facility-administered medications for this visit.       Past Medical History:  Past Medical History:   Diagnosis Date    Allergy     Encounter for blood transfusion     History of Nissen fundoplication     Rett's syndrome     Rett's syndrome     Seizures     Ulcerative colitis 04/2017       Past Surgical History:  Past Surgical History:   Procedure Laterality Date    ABDOMINAL SURGERY  10/2017    removal of entire colon and has ileostomy    COLON SURGERY      COLONOSCOPY N/A 4/11/2017    Procedure: COLONOSCOPY;  Surgeon: Isra Gonzalez MD;  Location: [facility] ENDO;  Service: Endoscopy;  Laterality: N/A;    ENTEROSTOMY N/A 10/28/2023    Procedure: Tube enterostomy;  Surgeon: OMID Burns MD;  Location: [facility] OR;  Service: General;  Laterality: N/A;    EXAMINATION UNDER ANESTHESIA N/A 10/28/2023    Procedure: Exam under anesthesia;  Surgeon: OMID Burns MD;  Location: [facility] OR;  Service: General;  Laterality: N/A;    ILEOSTOMY REVISION N/A 4/14/2020    Procedure: REVISION, ILEOSTOMY;  Surgeon: Larry Campos MD;  Location: [facility] OR;  Service: General;  Laterality: N/A;    NISSEN FUNDOPLICATION      prolapse stoma      REPAIR OF EPIGASTRIC HERNIA N/A 4/14/2020    Procedure: REPAIR, HERNIA, EPIGASTRIC-repair parastoma hernia;  Surgeon: Larry Campos MD;  Location: [facility] OR;  Service: General;  Laterality: N/A;    TOTAL COLECTOMY         Family History:  family history includes Allergies in her mother; Arthritis in her brother; Multiple sclerosis in her sister.    Social History:   reports that she has never smoked. She has never used smokeless tobacco. She reports that she does not drink alcohol and does not use drugs.    Physical Exam:  Vitals:    09/19/24 1530   Height: 4' 8" (1.422 m)   PainSc: 0-No pain     Body mass index is 15.69 kg/m².    Neurological Exam:  Mental status: Awake and alert  Speech " language: moaning, non verbal  Cranial nerves: PERRL  Motor: holding all limbs stiff and tense, eventually was able to get legs to nearly 180 degrees. Couldn't get BP or check tone in arms     Data:  I have personally reviewed other provider's notes, labs, & imaging made available to me today.     Labs:  CBC:   Lab Results   Component Value Date    WBC 13.13 (H) 10/29/2023    HGB 11.9 (L) 10/29/2023    HCT 36.4 (L) 10/29/2023     10/29/2023    MCV 96 10/29/2023    RDW 14.3 10/29/2023     BMP:   Lab Results   Component Value Date     10/29/2023    K 3.5 10/29/2023     10/29/2023    CO2 29 10/29/2023    BUN 8 10/29/2023    CREATININE 0.68 10/29/2023     (H) 10/29/2023    CALCIUM 7.8 (L) 10/29/2023    MG 2.3 10/29/2023    PHOS 3.0 10/29/2023     LFTS;   Lab Results   Component Value Date    PROT 6.3 10/29/2023    ALBUMIN 3.4 (L) 10/29/2023    BILITOT 0.3 10/29/2023    AST 26 10/29/2023    ALKPHOS 93 10/29/2023    ALT 19 10/29/2023     COAGS:   Lab Results   Component Value Date    INR 1.1 10/28/2023     FLP:   Lab Results   Component Value Date    CHOL 139 11/30/2018    HDL 23 (L) 11/30/2018    LDLCALC 73.0 11/30/2018    TRIG 215 (H) 11/30/2018    CHOLHDL 16.5 (L) 11/30/2018       Assessment and Plan:  Ms. Harmon is a 43 y.o. female here for followup of seizures and Rett syndrome.     Her seizures are overall well controlled. They think the klonopin is contributing to her being sleepy. Will try to replace it with Onfi to see if longer acting benzo will allow a wean of this. Will check trileptal level to ensure not toxic and not contributing to imbalance. Continue current dose of trileptal for now.     They are considering restarting trofinetide to see if it would help slow regression or help with the stiffening.  I think this is worth trying.     Seizure disorder  -     cloBAZam (ONFI) 10 mg Tab; Take 1 each (10 mg total) by mouth every evening.  Dispense: 30 each; Refill: 5  -      Oxcarbazepine level; Future; Expected date: 09/19/2024    Rett syndrome  -     cloBAZam (ONFI) 10 mg Tab; Take 1 each (10 mg total) by mouth every evening.  Dispense: 30 each; Refill: 5  -     Oxcarbazepine level; Future; Expected date: 09/19/2024         I spent a total of 49 minutes on the day of the visit.This includes face to face time and non-face to face time preparing to see the patient (eg, review of tests), Obtaining and/or reviewing separately obtained history, Documenting clinical information in the electronic or other health record, Independently interpreting results and communicating results to the patient/family/caregiver, or Care coordination.     Visit today is associated with current or anticipated ongoing medical care related to this patient's single serious condition/complex condition (epilepsy). Plan to followup in 6 months for ongoing management.

## 2024-09-19 NOTE — PATIENT INSTRUCTIONS
Try to change klonopin to Onfi    Week 1: Start Onfi 5 mg nightly, lower klonopin to 1 mg twice a day  Week 2: Increase Onfi to 10 mg nightly, lower klonopin to 0.5 mg twice a day  Week 3: Continue Onfi 10 mg nightly, lower klonopin to 0.25 mg twice a day  Week 4:  Continue Onfi 10 mg nightly, lower klonopin to 0.25 mg morning only for a week, then stop klonopin     Let me know if you notice seizures and we might need to increase Onfi

## 2024-09-25 ENCOUNTER — TELEPHONE (OUTPATIENT)
Dept: NEUROLOGY | Facility: CLINIC | Age: 44
End: 2024-09-25
Payer: MEDICARE

## 2024-09-25 NOTE — TELEPHONE ENCOUNTER
----- Message from Meagan Mccray sent at 9/25/2024  3:33 PM CDT -----  Regarding: med question  Contact: mom at [phone]  Type:  Pharmacy Calling to Clarify an RX    Name of Caller:  mom / rommel    Pharmacy Name:    [id number] [place] - [address]  [address]  [place] [address]  Phone: [phone] Fax: [phone]    Prescription Name:  cloBAZam (ONFI) 10 mg Tab and clonazePAM (KLONOPIN) 0.5 MG tablet    What do they need to clarify?:  [facility] not filling ONFI. Please call to clarify how provider wants meds handled.    Best Call Back Number:  [phone]    Additional Information:

## 2024-09-25 NOTE — TELEPHONE ENCOUNTER
Spoke to pharmacy and pharmacist states patient needs PA done, insurance isnt covering medication. LVM for pt's mom to call back regarding pharmacy not filling Onfi. We didn't receive a PA request.

## 2024-09-26 ENCOUNTER — PATIENT MESSAGE (OUTPATIENT)
Dept: NEUROLOGY | Facility: CLINIC | Age: 44
End: 2024-09-26
Payer: MEDICARE

## 2024-09-26 ENCOUNTER — TELEPHONE (OUTPATIENT)
Dept: NEUROLOGY | Facility: CLINIC | Age: 44
End: 2024-09-26
Payer: MEDICARE

## 2024-09-26 NOTE — TELEPHONE ENCOUNTER
----- Message from Gertrudis Jimenez sent at 9/26/2024 10:29 AM CDT -----  Type:  Needs Medical Advice    Who Called: Irma - mother    Symptoms (please be specific): na     How long has patient had these symptoms:  na    Pharmacy name and phone #:    [id number] [place] - [address]  [address]  [place] [address]  Phone: [phone] Fax: [phone]    Would the patient rather a call back or a response via [facility]? Call back    Best Call Back Number: [phone]      Additional Information: Irma is asking tp speak with someone in regards to her daughters medication      Please call Back to advise. Thanks!

## 2024-09-26 NOTE — TELEPHONE ENCOUNTER
Left voicemail to call back and sent [employer] message regarding whether parent would prefer to have rx filled with [facility] or send [facility] to [facility]. Advised to please let us know which they prefer.

## 2024-09-26 NOTE — TELEPHONE ENCOUNTER
PA for Clobazam submitted to both Medicare and Medicaid. Awaiting response from insurance. Patients mother notifed by portal.

## 2024-10-30 ENCOUNTER — OFFICE VISIT (OUTPATIENT)
Dept: PHYSICAL MEDICINE AND REHAB | Facility: CLINIC | Age: 44
End: 2024-10-30
Payer: MEDICARE

## 2024-10-30 VITALS — SYSTOLIC BLOOD PRESSURE: 108 MMHG | DIASTOLIC BLOOD PRESSURE: 60 MMHG | HEART RATE: 73 BPM

## 2024-10-30 DIAGNOSIS — G82.50 SPASTIC QUADRIPARESIS: Primary | ICD-10-CM

## 2024-10-30 DIAGNOSIS — Z78.9 IMPAIRED MOBILITY AND ACTIVITIES OF DAILY LIVING: ICD-10-CM

## 2024-10-30 DIAGNOSIS — Z15.1 RETT SYNDROME: ICD-10-CM

## 2024-10-30 DIAGNOSIS — Z74.09 IMPAIRED MOBILITY AND ACTIVITIES OF DAILY LIVING: ICD-10-CM

## 2024-10-30 DIAGNOSIS — F84.2 RETT SYNDROME: ICD-10-CM

## 2024-10-30 PROCEDURE — 99205 OFFICE O/P NEW HI 60 MIN: CPT | Mod: S$PBB,,, | Performed by: PHYSICAL MEDICINE & REHABILITATION

## 2024-10-30 PROCEDURE — 99213 OFFICE O/P EST LOW 20 MIN: CPT | Mod: PBBFAC,PO | Performed by: PHYSICAL MEDICINE & REHABILITATION

## 2024-10-30 PROCEDURE — 99999 PR PBB SHADOW E&M-EST. PATIENT-LVL III: CPT | Mod: PBBFAC,,, | Performed by: PHYSICAL MEDICINE & REHABILITATION

## 2024-11-08 ENCOUNTER — TELEPHONE (OUTPATIENT)
Dept: PHYSICAL MEDICINE AND REHAB | Facility: CLINIC | Age: 44
End: 2024-11-08
Payer: MEDICARE

## 2024-11-08 NOTE — TELEPHONE ENCOUNTER
LVM to call back or send a message through [employer]. No further needs at this time.   Lindsey Abreu RN       ----- Message from [employer] sent at 11/8/2024 10:05 AM CST -----  Type:  Sooner Apoointment Request    Caller is requesting a sooner appointment.  Caller declined first available appointment listed below.  Caller will not accept being placed on the waitlist and is requesting a message be sent to doctor.  Name of Caller: Se Solis  When is the first available appointment? N/a  Symptoms: botox  Would the patient rather a call back or a response via [facility]? call back/  Best Call Back Number:[phone]    Additional Information: Thanks

## 2024-11-25 ENCOUNTER — CLINICAL SUPPORT (OUTPATIENT)
Dept: PHYSICAL MEDICINE AND REHAB | Facility: CLINIC | Age: 44
End: 2024-11-25
Payer: MEDICARE

## 2024-11-25 DIAGNOSIS — G82.50 SPASTIC QUADRIPARESIS: Primary | ICD-10-CM

## 2024-11-25 PROCEDURE — 99999 PR PBB SHADOW E&M-EST. PATIENT-LVL II: CPT | Mod: PBBFAC,,, | Performed by: PHYSICAL MEDICINE & REHABILITATION

## 2024-11-25 PROCEDURE — 64643 CHEMODENERV 1 EXTREM 1-4 EA: CPT | Mod: S$PBB,,, | Performed by: PHYSICAL MEDICINE & REHABILITATION

## 2024-11-25 PROCEDURE — 99999PBSHW PR PBB SHADOW TECHNICAL ONLY FILED TO HB: Mod: JZ,PBBFAC,,

## 2024-11-25 PROCEDURE — 99499 UNLISTED E&M SERVICE: CPT | Mod: S$PBB,,, | Performed by: PHYSICAL MEDICINE & REHABILITATION

## 2024-11-25 PROCEDURE — 64643 CHEMODENERV 1 EXTREM 1-4 EA: CPT | Mod: PBBFAC,PO | Performed by: PHYSICAL MEDICINE & REHABILITATION

## 2024-11-25 PROCEDURE — 64642 CHEMODENERV 1 EXTREMITY 1-4: CPT | Mod: PBBFAC,PO | Performed by: PHYSICAL MEDICINE & REHABILITATION

## 2024-11-25 PROCEDURE — 95874 GUIDE NERV DESTR NEEDLE EMG: CPT | Mod: 26,S$PBB,, | Performed by: PHYSICAL MEDICINE & REHABILITATION

## 2024-11-25 RX ADMIN — ONABOTULINUMTOXINA 300 UNITS: 100 INJECTION, POWDER, LYOPHILIZED, FOR SOLUTION INTRADERMAL; INTRAMUSCULAR at 09:11

## 2024-11-25 NOTE — PROGRESS NOTES
[facility] PROCEDURE NOTE    CHIEF COMPLAINT:   Chief Complaint   Patient presents with    Injections     300 Botox        HISTORY OF PRESENT ILLNESS: No Harmon is a 43 y.o. female who presents to me for planned procedure/injection of focal neurotoxin for management of the below noted diagnosis.     Medications:   Current Outpatient Medications on File Prior to Visit   Medication Sig Dispense Refill    cloBAZam (ONFI) 10 mg Tab Take 1 each (10 mg total) by mouth every evening. 30 each 5    clonazePAM (KLONOPIN) 0.5 MG tablet TAKE 2 1/2 TABLETS BY MOUTH TWICE PER  tablet 3    levothyroxine (SYNTHROID) 25 MCG tablet Take 25 mcg by mouth before breakfast.       OXcarbazepine (TRILEPTAL) 300 MG Tab TAKE 3 TABLETS (900 MG TOTAL) BY MOUTH 2 (TWO) TIMES DAILY. 540 tablet 0     No current facility-administered medications on file prior to visit.       Allergies: Review of patient's allergies indicates:  No Known Allergies    Vitals: There were no vitals filed for this visit.    ASSESSMENT:   1. Spastic quadriparesis        PLAN:   1. Procedure/injection was completed for management of above diagnosis.    2. Please see procedure note from today's visit with further details.     Left medial gastrocnemius                  100 units  Left flexor digitorum longus                50 units  Right medial gastrocnemius               100 units  Right flexor digitorum longus              50 units    RTC 1 month to assess response.

## 2024-11-25 NOTE — PROCEDURES
Botulinum Injection  Location: [facility]    Date/Time: 11/25/2024 9:00 AM    Performed by: Magaly Jung DO  Authorized by: Magaly Jung DO      Consent:      Consent obtained:  Written     Consent given by:  Patient     Risks discussed:  Bleeding, infection, dysphagia, pain and weakness     Alternatives discussed:  No treatment    Universal protocol:      Site/side verified:  Yes       Immediately prior to procedure a time out was called:  Yes       Patient identity confirmed:  Verbally with patient    Procedure details:      EMG used?:  Yes     Diluted by:  Preservative free saline     Toxin (Brand):  OnaBoNT-A (Botox)     Total number of units available:  300     Muscle area injected: leg    Lower extremity - leg:      Right flexor digitorum longus:  50 units divided amongst site(s)     Left flexor digitorum longus:  50 units divided amongst site(s)     Right medial head gastrocnemius:  100 units divided amongst site(s)     Left medial head gastrocnemius:  100 units divided amongst site(s)       Total units injected:  300     Total units wasted:  0    Medications: 300 Units onabotulinumtoxina 100 unit    Post-procedure details:      Patient tolerance of procedure:  Tolerated well, no immediate complications

## 2025-01-03 ENCOUNTER — OFFICE VISIT (OUTPATIENT)
Dept: PHYSICAL MEDICINE AND REHAB | Facility: CLINIC | Age: 45
End: 2025-01-03
Payer: MEDICARE

## 2025-01-03 VITALS — BODY MASS INDEX: 16.66 KG/M2 | WEIGHT: 74.31 LBS

## 2025-01-03 DIAGNOSIS — G82.50 SPASTIC QUADRIPARESIS: Primary | ICD-10-CM

## 2025-01-03 DIAGNOSIS — F84.2 RETT SYNDROME: ICD-10-CM

## 2025-01-03 DIAGNOSIS — Z78.9 IMPAIRED MOBILITY AND ACTIVITIES OF DAILY LIVING: ICD-10-CM

## 2025-01-03 DIAGNOSIS — Z15.1 RETT SYNDROME: ICD-10-CM

## 2025-01-03 DIAGNOSIS — Z74.09 IMPAIRED MOBILITY AND ACTIVITIES OF DAILY LIVING: ICD-10-CM

## 2025-01-03 PROCEDURE — 99212 OFFICE O/P EST SF 10 MIN: CPT | Mod: PBBFAC,PO | Performed by: PHYSICAL MEDICINE & REHABILITATION

## 2025-01-03 PROCEDURE — 99999 PR PBB SHADOW E&M-EST. PATIENT-LVL II: CPT | Mod: PBBFAC,,, | Performed by: PHYSICAL MEDICINE & REHABILITATION

## 2025-01-03 NOTE — PROGRESS NOTES
[facility] VISIT     Primary Care Provider: Bernadette Moore MD     CHIEF COMPLAINT:   1.   Chief Complaint   Patient presents with    Follow-up     Botox to BLE. Mom doesn't think the Botox helped, but said pt was walking better.      2. Mobility and function management recommendations.     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     They are here today with family, who provide history due to patient cognitive and speech dysfunction.     Previously seen for Botox injections to the following:  Left medial gastrocnemius                  100 units  Left flexor digitorum longus                50 units  Right medial gastrocnemius               100 units  Right flexor digitorum longus              50 units    Reports,  F/u Botox injections: no noticeable difference after the injections, however they do note that she is walking more but they believe this is from improved titration of seizure meds with neurology.   F/u custom MWC: working with [facility] on delivery.     Mobility/function: see above  Dysphagia: no concerns  Pain: no concerns  Skin: denies  Bowel: ostomy   - Beth manages  Bladder: diaper  - OB/GYN [place]    Medications: clonazepam, clobazam (working with neurology on titration)  Injections: Botox BLE   Surgical procedures: none    EQUIPMENT:  Braces: none  Wheelchair: MWC self funded  Walker: none    MOBILITY/TRANSFERS:  Walking: non-ambulatory     ACTIVITIES OF DAILY LIVING:  Upper extremity dressing: dep  Lower extremity dressing: dep  Bathe: dep  Groom: dep  Toilet: dep    THERAPY/LOCATION:  PT: none currently  OT: none currently    LIVING SITUATION: lives parents, raised home [address]    PAST MEDICAL HISTORY:  Past Medical History:   Diagnosis Date    Allergy     Encounter for  blood transfusion     History of Nissen fundoplication     Rett's syndrome     Rett's syndrome     Seizures     Ulcerative colitis 04/2017        PAST SURGICAL HISTORY:   Past Surgical History:   Procedure Laterality Date    ABDOMINAL SURGERY  10/2017    removal of entire colon and has ileostomy    COLON SURGERY      COLONOSCOPY N/A 4/11/2017    Procedure: COLONOSCOPY;  Surgeon: Isra Gonzalez MD;  Location: [facility] ENDO;  Service: Endoscopy;  Laterality: N/A;    ENTEROSTOMY N/A 10/28/2023    Procedure: Tube enterostomy;  Surgeon: OMID Burns MD;  Location: [facility] OR;  Service: General;  Laterality: N/A;    EXAMINATION UNDER ANESTHESIA N/A 10/28/2023    Procedure: Exam under anesthesia;  Surgeon: OMID Burns MD;  Location: [facility] OR;  Service: General;  Laterality: N/A;    ILEOSTOMY REVISION N/A 4/14/2020    Procedure: REVISION, ILEOSTOMY;  Surgeon: Larry Campos MD;  Location: [facility] OR;  Service: General;  Laterality: N/A;    NISSEN FUNDOPLICATION      prolapse stoma      REPAIR OF EPIGASTRIC HERNIA N/A 4/14/2020    Procedure: REPAIR, HERNIA, EPIGASTRIC-repair parastoma hernia;  Surgeon: Larry Campos MD;  Location: [facility] OR;  Service: General;  Laterality: N/A;    TOTAL COLECTOMY          FAMILY HISTORY:   Family History   Problem Relation Name Age of Onset    Allergies Mother      Multiple sclerosis Sister      Arthritis Brother          psoriatric arthritis       SOCIAL HISTORY:    Social History     Socioeconomic History    Marital status: Single   Tobacco Use    Smoking status: Never    Smokeless tobacco: Never   Substance and Sexual Activity    Alcohol use: No    Drug use: No    Sexual activity: Never     Social Drivers of Health     Financial Resource Strain: Patient Declined (9/18/2024)    Overall Financial Resource Strain (CARDIA)     Difficulty of Paying Living Expenses: Patient declined   Food Insecurity: No Food Insecurity (9/18/2024)    Hunger Vital Sign     Worried About Running Out of Food  in the Last Year: Never true     Ran Out of Food in the Last Year: Never true   Transportation Needs: No Transportation Needs (10/28/2023)    PRAPARE - Transportation     Lack of Transportation (Medical): No     Lack of Transportation (Non-Medical): No   Physical Activity: Unknown (9/18/2024)    Exercise Vital Sign     Days of Exercise per Week: 0 days   Stress: No Stress Concern Present (9/18/2024)    [employer] - Occupational Stress Questionnaire     Feeling of Stress : Not at all   Housing Stability: Low Risk  (10/28/2023)    Housing Stability Vital Sign     Unable to Pay for Housing in the Last Year: No     Number of Places Lived in the Last Year: 1     Unstable Housing in the Last Year: No       MEDICATIONS:     Current Outpatient Medications:     cloBAZam (ONFI) 10 mg Tab, Take 1 each (10 mg total) by mouth every evening., Disp: 30 each, Rfl: 5    clonazePAM (KLONOPIN) 0.5 MG tablet, TAKE 2 1/2 TABLETS BY MOUTH TWICE PER DAY, Disp: 150 tablet, Rfl: 3    levothyroxine (SYNTHROID) 25 MCG tablet, Take 25 mcg by mouth before breakfast. , Disp: , Rfl:     OXcarbazepine (TRILEPTAL) 300 MG Tab, TAKE 3 TABLETS (900 MG TOTAL) BY MOUTH 2 (TWO) TIMES DAILY., Disp: 540 tablet, Rfl: 0     ALLERGIES:   Review of patient's allergies indicates:  No Known Allergies    REVIEW OF SYSTEMS: unable to assess due to patient congitive and speech delays    PHYSICAL EXAMINATION:   VITALS:   There were no vitals filed for this visit.      GENERAL: The patient is awake, alert, rubbing her face with right hand throughout exam  HEENT: Normocephalic, atraumatic.   HEART: Appears well perfused. No lower extremity edema.   LUNGS: No increased work of breath.   ABDOMEN: Benign.   EXTREMITIES: Warm, capillary refill less than 2 seconds. No cyanosis or edema.   MUSCULOSKELETAL: No leg length discrepancy. No gross deformity.     NEUROMUSCULAR:   Before toxin injections:   Modified Royer Scale:  1:  1+: bilateral  "elbow flexors  2: bilateral ankle plantarflexors, bilateral ankle toe flexors  3:  4:     After toxin injections:     Modified Royer Scale:  1:  1+: bilateral elbow flexors; bilateral ankle plantarflexors, bilateral ankle toe flexors  2:   3:  4:   Inappropriate sitting balance.   Muscle stretch reflexes are 3+ throughout both upper and lower extremities.   + clonus was elicited at both ankles.    EQUIPMENT: [employer] with significant spacing between bilateral hips and not lateral supports to hold proper posture.    ASSESSMENT:   1. Spastic quadriparesis        2. Rett syndrome        3. 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 The following recommendations and plan were discussed in depth with the patient who voiced understanding and was in agreement.     PLAN:   Spasticity:   - we discussed options for management at this time are with oral medication, focal neurotoxin, focal cryoneurolysis with Iovera, and focal neurolysis with phenol as follows:   - will avoid initiation of orals at this time as Neurology team is actively working on titration of medications; consider use of dantrolene in future as it does not have cognitive disturbance risks  - family unsure of change after Botox injections, examination with slight improvement in tone. For now we will see how she progresses as she has shown recent improvements in function after titration of seizure medication.   - family will contact our office with concerns going forward and we could consider repeat injections after the "washout" period to determine how much injections were helping her function    Bracing:    - no needs at this time    Equipment:   - needs improved sitting position in MWC,  custom MWC ordered. Working with [facility].     Bowel:  - continue with ostomy cares    Bladder:   - incontinence " with diapers    Therapy:   - PT:   - OT:   - ST:     I would like to have her return to clinic as needed.     30 minutes of total time spent on the encounter, which includes face to face time and non-face to face time preparing to see the patient (eg, review of tests), obtaining and/or reviewing separately obtained history, documenting clinical information in the electronic or other health record, independently interpreting results (not separately reported), communicating results to the patient/family/caregiver, and/or care coordination (not separately reported).    Ostomy bag

## 2025-03-12 ENCOUNTER — OFFICE VISIT (OUTPATIENT)
Dept: NEUROLOGY | Facility: CLINIC | Age: 45
End: 2025-03-12
Payer: MEDICARE

## 2025-03-12 VITALS — HEIGHT: 56 IN | BODY MASS INDEX: 16.66 KG/M2

## 2025-03-12 DIAGNOSIS — S09.90XA HEAD TRAUMA, INITIAL ENCOUNTER: Primary | ICD-10-CM

## 2025-03-12 DIAGNOSIS — G40.909 SEIZURE DISORDER: ICD-10-CM

## 2025-03-12 PROCEDURE — 99215 OFFICE O/P EST HI 40 MIN: CPT | Mod: S$PBB,,, | Performed by: PSYCHIATRY & NEUROLOGY

## 2025-03-12 PROCEDURE — G2211 COMPLEX E/M VISIT ADD ON: HCPCS | Mod: S$PBB,,, | Performed by: PSYCHIATRY & NEUROLOGY

## 2025-03-12 PROCEDURE — 99999 PR PBB SHADOW E&M-EST. PATIENT-LVL III: CPT | Mod: PBBFAC,,, | Performed by: PSYCHIATRY & NEUROLOGY

## 2025-03-12 PROCEDURE — 99213 OFFICE O/P EST LOW 20 MIN: CPT | Mod: PBBFAC,PO | Performed by: PSYCHIATRY & NEUROLOGY

## 2025-03-12 RX ORDER — CLONAZEPAM 0.5 MG/1
0.5 TABLET ORAL 2 TIMES DAILY PRN
COMMUNITY

## 2025-03-12 RX ORDER — LEVOCETIRIZINE DIHYDROCHLORIDE 5 MG/1
2.5 TABLET, FILM COATED ORAL
COMMUNITY

## 2025-03-12 RX ORDER — CLOBAZAM 2.5 MG/ML
2.5 SUSPENSION ORAL NIGHTLY
Qty: 30 ML | Refills: 5 | Status: SHIPPED | OUTPATIENT
Start: 2025-03-12

## 2025-03-12 NOTE — PROGRESS NOTES
Date: 3/12/2025    Patient ID: No Harmon is a 44 y.o. female.    Chief Complaint: Seizures and Follow-up      History of Present Illness:  Ms. Harmon is a 44 y.o. female who presents for followup of Rett syndrome and epilepsy. The patient was accompanied by her mother and father who gave the following history.     Interval history: At her last appointment in September 2024, we started Onfi nightly and stopped klonopin. She is currently taking Onfi 5 mg nightly. 10 mg made her too sedated. They took her off klonopin completely but seizures worsened. They added back klonopin 0.5 mg BID for seizures. She is having seizures weekly, small ones. She is also on trileptal 900 mg BID. Level was 29 in Sep 2024. Between 9/9/24 and today she has had 16 seizures.      She is not walking much. She has been leaning more to the right frequently when sitting of walking. She will fall if not assisted. She has to had 1 person assist to walk. She is toe walking. She can take steps. She has hit her head with the falls.      She was part of a trial and on trofenetide but stopped this in Oct 2023. In October 2024, they restarted this at 40 mL BID. They have not noticed improvement in her walking.     She has seen Dr. Jung in PMR and recommended botox which they did not notice any significant difference.      Seizure history:   She had onset of epilepsy was at age 5. Her seizure semiology she has pupillary diolation, hyperventilations, arms stiffen and shake and legs leg, she will fall. They last 1-2 minutes and she is mildly post-ictal afterwards. She commonly has 3-5 per month. She has never had better control that than. She takes trileptal 600 mg BID and clonazepam 1 mg BID. Her mother notes that she will have a seizure if her medication is late. She was previously on 900 mg BID and she has lost weight.      She was previously controlled but in November 2018 had complications due to ulcerative colitis, bowel obstruction, stoma  prolapse, and abscess. She was unable to take oral medciations and was switched from her typical regimen of oxcarbazepine 600 mg BID and clonazepam 1 mg BID to keppra. She was on IV depakote briefly and got hyperammonia. She was then switched to keppra. She was having seizures and had side effects. She became very somnolent and dehydrated on the keppra and had reduced PO intake. She was seen by Dr. Magaña in the hospital who stopped the keppra and restarted oxcarbazepine and clonazepam.     She also follows with [facility]. Neurology there had her on buspirone and tried to wean klonopin. They put her back up on klonopin because seizure frequency was increasing. We changed this to Onfi. Buspirone caused grogginess so they weaned off it.      No family history of seizures. No meningitis/encephalitis. No head trauma. No stroke. Uncomplicated full term delivery.      Past medications include   tegretol (had to wean off before the study),   Trofinetide (study drug she took for a month with complete seizure control),   keppra (somnolence),   depakote (hyperammonemia),   klonopin (somnolence)  Onfi  trileptal  No VNS.     AEDs not tried:  acetazolamide (Diamox, AZM)  amantadine  brivaracetam (Briviact)  Cannabidiol (Epidiolex)  Cenobamate (Xcopri)  ethosuximide (Zarontin, ESM)  eslicarbazine (Aptiom, ESL)  felbamate (felbatol, FBM)  gabapentin (Neurontin, GPN)  lacosamide (Vimpat, LCS)   lamotrigine (Lamictal, LTG)   methsuximide (Celontin, MSM)  methyphenytoin (Mesantion, MHT)  perampanel (Fycompa, FCP)   phenobarbital (Pb)  phenytoin (Dilantin, PHT)  pregabalin (Lyrica, PGB)  primidone (Mysoline, PRM)  retigabine (Potiga, RTG)  rufinamide (Banzel, RUF)  tiagabine (Gabatril,  TGB)  topiramate (Topamax, TPM)  viagabatrin, (Sabril, VGB)  vagal nerve stimulator (VNS)  zonisamide (Zonegran, ZNA)  Benzodiazepines  diazepam - rectal (Diastatl)  diazepam - oral (Valium, DZ)  clorazepate (Tranxene, CLZ)  Ativan       MRI - one at [facility] when she was young and diagnosed by Dr. Goodman. CT head in our system from 2016 was personally reviewed. This showed significant sinus disease but normal brain.      EEG - none available. About 5 years ago during study at [facility]. This was during a study though and we don't know if they are available to be sent.      Outside records from [facility] reviewed. Trileptal level in outside records from [facility] was 27.7 in 2016. 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       Allergies:  Review of patient's allergies indicates:  No Known Allergies    Current Medications:  Current Medications[1]    Past Medical History:  Past Medical History:   Diagnosis Date    Allergy     Encounter for blood transfusion     History of Nissen fundoplication     Rett's syndrome     Rett's syndrome     Seizures     Ulcerative colitis 04/2017       Past Surgical History:  Past Surgical History:   Procedure Laterality Date    ABDOMINAL SURGERY  10/2017    removal of entire colon and has ileostomy    COLON SURGERY      COLONOSCOPY N/A 4/11/2017    Procedure: COLONOSCOPY;  Surgeon: Isra Gonzalez MD;  Location: [facility] ENDO;  Service: Endoscopy;  Laterality: N/A;    ENTEROSTOMY N/A 10/28/2023    Procedure: Tube enterostomy;  Surgeon: OMID Burns MD;  Location: [facility] OR;  Service: General;  Laterality: N/A;    EXAMINATION UNDER ANESTHESIA N/A 10/28/2023    Procedure: Exam under anesthesia;  Surgeon: OMID Burns MD;  Location: [facility] OR;  Service: General;  Laterality: N/A;    ILEOSTOMY REVISION N/A 4/14/2020    Procedure: REVISION, ILEOSTOMY;  Surgeon: Larry Campos MD;  Location: [facility] OR;  Service: General;  Laterality: N/A;    NISSEN FUNDOPLICATION      prolapse stoma      REPAIR OF EPIGASTRIC HERNIA N/A 4/14/2020    Procedure: REPAIR, HERNIA, EPIGASTRIC-repair  "parastoma hernia;  Surgeon: Larry Campos MD;  Location: [facility] OR;  Service: General;  Laterality: N/A;    TOTAL COLECTOMY         Family History:  family history includes Allergies in her mother; Arthritis in her brother; Multiple sclerosis in her sister.    Social History:   reports that she has never smoked. She has never used smokeless tobacco. She reports that she does not drink alcohol and does not use drugs.    Physical Exam:  Vitals:    03/12/25 1507   Height: 4' 8" (1.422 m)   PainSc: 0-No pain     Body mass index is 16.66 kg/m².    Neurological Exam:  Mental status: Awake and alert  Speech language: Non verbal, vocalizes some  Cranial nerves: Face symmetric, keeps eyes squinted/closed  Motor: normal tone in both arms and left leg, some mild increased tone in right leg    Data:  I have personally reviewed other provider's notes, labs, & imaging made available to me today.     Labs:  CBC:   Lab Results   Component Value Date    WBC 13.13 (H) 10/29/2023    HGB 11.9 (L) 10/29/2023    HCT 36.4 (L) 10/29/2023     10/29/2023    MCV 96 10/29/2023    RDW 14.3 10/29/2023     BMP:   Lab Results   Component Value Date     10/29/2023    K 3.5 10/29/2023     10/29/2023    CO2 29 10/29/2023    BUN 8 10/29/2023    CREATININE 0.68 10/29/2023     (H) 10/29/2023    CALCIUM 7.8 (L) 10/29/2023    MG 2.3 10/29/2023    PHOS 3.0 10/29/2023     LFTS;   Lab Results   Component Value Date    PROT 6.3 10/29/2023    ALBUMIN 3.4 (L) 10/29/2023    BILITOT 0.3 10/29/2023    AST 26 10/29/2023    ALKPHOS 93 10/29/2023    ALT 19 10/29/2023     COAGS:   Lab Results   Component Value Date    INR 1.1 10/28/2023     FLP:   Lab Results   Component Value Date    CHOL 139 11/30/2018    HDL 23 (L) 11/30/2018    LDLCALC 73.0 11/30/2018    TRIG 215 (H) 11/30/2018    CHOLHDL 16.5 (L) 11/30/2018           Assessment and Plan:  Ms. Harmon is a 44 y.o. female here for followup of seizures. Her seizures have been weekly but " parents note that they want a balance of seizure control and not overly sedating her. They would like to try to reduce the Onfi and monitor seizures. Will reduce to 2.5 mg nightly and see if we need to increase the klonopin again slightly or increase onfi back to 5 mg if seizures worsen.     I think her decline is the rett syndrome itself but with the leaning to the right side, I advised we obtain CT head to rule out other secondary cause such as stroke.       Head trauma, initial encounter  -     CT Head Without Contrast; Future; Expected date: 03/12/2025    Seizure disorder  -     cloBAZam (ONFI) 2.5 mg/mL Susp; Take 1 mL (2.5 mg total) by mouth every evening.  Dispense: 30 mL; Refill: 5      I spent a total of 43 minutes on the day of the visit.This includes face to face time and non-face to face time preparing to see the patient (eg, review of tests), Obtaining and/or reviewing separately obtained history, Documenting clinical information in the electronic or other health record, Independently interpreting results and communicating results to the patient/family/caregiver, or Care coordination.     Visit today is associated with current or anticipated ongoing medical care related to this patient's single serious condition/complex condition (epilepsy). Plan to followup in 6 months for ongoing management.             [1]   Current Outpatient Medications   Medication Sig Dispense Refill    clonazePAM (KLONOPIN) 0.5 MG tablet Take 0.5 mg by mouth 2 (two) times daily as needed for Anxiety.      levocetirizine (XYZAL) 5 MG tablet Take 2.5 mg by mouth as needed for Allergies.      levothyroxine (SYNTHROID) 25 MCG tablet Take 25 mcg by mouth before breakfast.       OXcarbazepine (TRILEPTAL) 300 MG Tab TAKE 3 TABLETS (900 MG TOTAL) BY MOUTH 2 (TWO) TIMES DAILY. 540 tablet 0    TROFINETIDE ORAL Take 40 mLs by mouth 2 (two) times daily.      cloBAZam (ONFI) 2.5 mg/mL Susp Take 1 mL (2.5 mg total) by mouth every evening. 30  mL 5     No current facility-administered medications for this visit.

## 2025-04-02 ENCOUNTER — HOSPITAL ENCOUNTER (OUTPATIENT)
Dept: RADIOLOGY | Facility: HOSPITAL | Age: 45
Discharge: HOME | End: 2025-04-02
Attending: PSYCHIATRY & NEUROLOGY
Payer: MEDICARE

## 2025-04-02 ENCOUNTER — RESULTS FOLLOW-UP (OUTPATIENT)
Dept: NEUROLOGY | Facility: CLINIC | Age: 45
End: 2025-04-02

## 2025-04-02 DIAGNOSIS — S09.90XA HEAD TRAUMA, INITIAL ENCOUNTER: ICD-10-CM

## 2025-04-02 PROCEDURE — 70450 CT HEAD/BRAIN W/O DYE: CPT | Mod: TC,PO

## 2025-04-02 PROCEDURE — 70450 CT HEAD/BRAIN W/O DYE: CPT | Mod: 26,,, | Performed by: RADIOLOGY
